# Patient Record
Sex: FEMALE | Race: WHITE
[De-identification: names, ages, dates, MRNs, and addresses within clinical notes are randomized per-mention and may not be internally consistent; named-entity substitution may affect disease eponyms.]

---

## 2017-01-04 ENCOUNTER — HOSPITAL ENCOUNTER (EMERGENCY)
Age: 32
Discharge: HOME | End: 2017-01-04
Payer: COMMERCIAL

## 2017-01-04 PROCEDURE — 99282 EMERGENCY DEPT VISIT SF MDM: CPT

## 2017-01-04 NOTE — ED
General Adult HPI





- General


Chief complaint: Skin/Abscess/Foreign Body


Stated complaint: rash


Time Seen by Provider: 01/04/17 18:16


Source: patient, RN notes reviewed


Mode of arrival: ambulatory


Limitations: no limitations





- History of Present Illness


Initial comments: 


This is a 31-year-old female presents with rash to the chest, left shoulders 

and bilateral hands 1 week.  Patient states she was treated with week ago with 

permethrin cream for scabies.  Patient states the rash is not gone.  Patient 

states the itching is worse at night. Patient denies any fever/chills, nausea/

vomiting/diarrhea.  Patient states she lives at home with her children and they 

have also been treated. Patient denies any recent fever, chills, shortness 

breath, chest pain, abdominal pain, nausea/vomiting/diarrhea, back pain, 

numbness, tingling,  hematuria, headache, or visual changes, or any other 

complaints.








- Related Data


 Previous Rx's











 Medication  Instructions  Recorded


 


Permethrin 5% Cream [Elimite] 1 applic TOPICAL ONCE #1 bottle 01/04/17











 Allergies











Allergy/AdvReac Type Severity Reaction Status Date / Time


 


No Known Allergies Allergy   Verified 01/04/17 18:20














Review of Systems


ROS Statement: 


Those systems with pertinent positive or pertinent negative responses have been 

documented in the HPI.





ROS Other: All systems not noted in ROS Statement are negative.





Past Medical History


Past Medical History: No Reported History


Additional Past Medical History / Comment(s): PANCREATITIS


History of Any Multi-Drug Resistant Organisms: None Reported


Past Surgical History: Cholecystectomy, Tubal Ligation


Past Psychological History: Anxiety


Smoking Status: Current every day smoker


Past Alcohol Use History: None Reported


Past Drug Use History: None Reported





General Exam





- General Exam Comments


Initial Comments: 


General:  The patient is awake and alert, in no distress, and does not appear 

acutely ill.  


Neck:  The neck is supple, there is no tenderness or JVD.  


Cardiovascular:  There is a regular rate and rhythm. No murmur, rub or gallop 

is appreciated.


Respiratory:  Lungs are clear to auscultation, respirations are non-labored, 

breath sounds are equal.  No wheezes, stridor, rales, or rhonchi.


Musculoskeletal:  Normal ROM, no tenderness.  Strength 5/5. Sensation intact. 

Pulses equal bilaterally 2+.  


Neurological:  A&O x 3. CN II-XII intact, There are no obvious motor or sensory 

deficits. Coordination appears grossly intact. Speech is normal.


Skin: There are scattered erythematous lesions with eschar to the left shoulder

, chest and bilateral hands consistent with scabies.  These areas are pruritic.

  Skin is warm and dry.


Psychiatric:  Cooperative, appropriate mood & affect, normal judgment.  








Limitations: no limitations





Course


 Vital Signs











  01/04/17





  17:48


 


Temperature 97.2 F L


 


Pulse Rate 60


 


Respiratory 18





Rate 


 


Blood Pressure 100/71


 


O2 Sat by Pulse 99





Oximetry 














Medical Decision Making





- Medical Decision Making


This is a 31-year-old female who presents with persistent rash.  Patient was 

treated for scabies one week ago.  On physical exam there are scattered 

erythematous lesions with eschar to the left shoulder, chest and bilateral 

hands consistent with scabies.  These areas are pruritic.  Skin is warm and 

dry.  I discussed with patient that she will receive a prescription for a 

repeat treatment with permethrin.  I discussed proper use of permethrin and 

importance of washing clothes and bedding in hot water daily.  I discussed 

return parameters. Discussed that patient should follow up with PCP in one to 2 

days or return to the [facility] for any worsening symptoms or for any further 

concerns.  Patient was receptive to this plan and patient will be discharged 

home.  








Disposition


Clinical Impression: 


 Scabies





Disposition: HOME SELF-CARE


Condition: Good


Instructions:  Scabies (ED)


Additional Instructions: 


Apply from head to toe and leave on for 8-14 hours.  Wash off in the shower.  

Repeat the process in 7 days.  Please wash sheets and clothing in hot water 

every day.Please use medication as discussed.  Please follow-up with family 

doctor in the next 2 days of symptoms have not improved.  Please return to 

emergency room if the symptoms increase or worsen or for any other concerns.


Prescriptions: 


Permethrin 5% Cream [Elimite] 1 applic TOPICAL ONCE #1 bottle


Referrals: 


None,Stated [Primary Care Provider] - 1-2 days


Time of Disposition: 18:25

## 2018-04-12 ENCOUNTER — HOSPITAL ENCOUNTER (EMERGENCY)
Dept: HOSPITAL 47 - EC | Age: 33
Discharge: HOME | End: 2018-04-12
Payer: COMMERCIAL

## 2018-04-12 VITALS
HEART RATE: 76 BPM | TEMPERATURE: 98 F | DIASTOLIC BLOOD PRESSURE: 72 MMHG | SYSTOLIC BLOOD PRESSURE: 122 MMHG | RESPIRATION RATE: 18 BRPM

## 2018-04-12 DIAGNOSIS — M47.897: ICD-10-CM

## 2018-04-12 DIAGNOSIS — M54.5: Primary | ICD-10-CM

## 2018-04-12 DIAGNOSIS — Z87.891: ICD-10-CM

## 2018-04-12 PROCEDURE — 99283 EMERGENCY DEPT VISIT LOW MDM: CPT

## 2018-04-12 PROCEDURE — 72100 X-RAY EXAM L-S SPINE 2/3 VWS: CPT

## 2018-04-12 NOTE — XR
EXAMINATION TYPE: XR lumbar spine 2 or 3V

 

DATE OF EXAM: 4/12/2018

 

COMPARISON: NONE

 

HISTORY: 32-year-old female low back pain

 

TECHNIQUE: 3 views

 

FINDINGS: 

5 lumbar type vertebral bodies. There may be very mild disc space narrowing at L5-S1. Vertebral body 
heights are maintained and alignment is preserved.

 

 

IMPRESSION: 

There may be early degenerative disc disease at L5-S1. No vertebral compression collapse or malalignm
ent.

## 2018-04-12 NOTE — ED
General Adult HPI





- General


Chief complaint: Back Pain/Injury


Stated complaint: Back pain


Time Seen by Provider: 04/12/18 09:45


Source: patient, RN notes reviewed


Mode of arrival: ambulatory


Limitations: no limitations





- History of Present Illness


Initial comments: 





Patient 32-year-old female presented to the emergency room today with a chief 

complaint of increased lower back pain that began approximately 30 minutes ago.

  She states she was at work pushing a bed when she felt a pop in the lower 

back.  She states she feels it radiate down to the top of her foot.  Patient 

states it's worse with movements.  States worse with ambulation.  States worse 

when she is bearing weight on either leg.  Patient denies bowel or bladder 

incontinence retention.  Denies any saddle anesthesia.  Patient states she has 

not taken anything for the pain. Patient denies any recent fever, chills, 

shortness of breath, chest pain, abdominal pain, nausea or vomiting, dysuria or 

hematuria, constipation or diarrhea, headaches or visual changes, or any other 

complaints.





- Related Data


 Previous Rx's











 Medication  Instructions  Recorded


 


Cyclobenzaprine [Flexeril] 10 mg PO TID #20 tab 04/12/18


 


Ibuprofen [Motrin] 600 mg PO Q6HR PRN #40 day 04/12/18











 Allergies











Allergy/AdvReac Type Severity Reaction Status Date / Time


 


No Known Allergies Allergy   Verified 04/12/18 09:46














Review of Systems


ROS Statement: 


Those systems with pertinent positive or pertinent negative responses have been 

documented in the HPI.





ROS Other: All systems not noted in ROS Statement are negative.





Past Medical History


Past Medical History: No Reported History


Additional Past Medical History / Comment(s): PANCREATITIS


History of Any Multi-Drug Resistant Organisms: None Reported


Past Surgical History: Cholecystectomy, Tubal Ligation


Past Psychological History: Anxiety


Smoking Status: Former smoker


Past Alcohol Use History: None Reported


Past Drug Use History: None Reported





General Exam


Limitations: no limitations





Course





 Vital Signs











  04/12/18





  09:42


 


Temperature 98.0 F


 


Pulse Rate 85


 


Respiratory 20





Rate 


 


Blood Pressure 114/67


 


O2 Sat by Pulse 98





Oximetry 














Medical Decision Making





- Medical Decision Making





Patient's x-ray reviewed does show some degenerative changes at L5-S1.  No 

other acute abnormalities X-rays.  Results were discussed with patient.  

Patient did not want any medications here the emergency room.  She is agreeable 

for prescription of ibuprofen and muscle relaxant.  Patient advised that muscle 

relaxer may make her drowsy.  Patient is advised follow-up with employee 

health.  Patient advised to use heat to the area.  Advised to return if 

symptoms increase or worsen or for any other concerns.





Disposition


Clinical Impression: 


 Acute low back pain





Disposition: HOME SELF-CARE


Condition: Good


Instructions:  Acute Low Back Pain (ED)


Additional Instructions: 


Please use medication as discussed.  Please follow-up with family doctor in the 

next 2 days of symptoms have not improved.  Please return to emergency room if 

the symptoms increase or worsen or for any other concerns.


Prescriptions: 


Cyclobenzaprine [Flexeril] 10 mg PO TID #20 tab


Ibuprofen [Motrin] 600 mg PO Q6HR PRN #40 day


 PRN Reason: Pain


Referrals: 


None,Stated [Primary Care Provider] - 1-2 days


Time of Disposition: 10:36

## 2018-08-29 ENCOUNTER — HOSPITAL ENCOUNTER (EMERGENCY)
Dept: HOSPITAL 47 - EC | Age: 33
Discharge: HOME | End: 2018-08-29
Payer: SELF-PAY

## 2018-08-29 VITALS
HEART RATE: 79 BPM | SYSTOLIC BLOOD PRESSURE: 97 MMHG | RESPIRATION RATE: 18 BRPM | DIASTOLIC BLOOD PRESSURE: 63 MMHG | TEMPERATURE: 98.1 F

## 2018-08-29 DIAGNOSIS — M79.672: Primary | ICD-10-CM

## 2018-08-29 DIAGNOSIS — Z87.891: ICD-10-CM

## 2018-08-29 PROCEDURE — 99283 EMERGENCY DEPT VISIT LOW MDM: CPT

## 2018-08-29 NOTE — XR
EXAMINATION TYPE: XR foot complete LT

 

DATE OF EXAM: 8/29/2018

 

CLINICAL HISTORY: pain

 

TECHNIQUE: Frontal, lateral and oblique images of the left foot are obtained.

 

COMPARISON: None.

 

FINDINGS:  There is no acute fracture/dislocation evident. The joint spaces  appear within normal [place]
its.  The overlying soft tissue appears unremarkable.

 

IMPRESSION:  

There is no acute fracture or dislocation.

 

ICD 10 NO FRACTURE, INITIAL EVALUATION

## 2018-08-29 NOTE — ED
Lower Extremity Injury HPI





- General


Chief Complaint: Extremity Injury, Lower


Stated Complaint: Foot Pain


Time Seen by Provider: 08/29/18 16:21


Source: patient


Mode of arrival: ambulatory


Limitations: no limitations





- History of Present Illness


Initial Comments: 


This a 33-year-old female with no past medical history who presents today for 

chief complaint of left foot pain.  Patient states that 3 days ago she stepped 

on a toy that was covered by laundry.  She merely noticed pain in the bottom of 

her foot.  Patient went to work the next day, watch left foot.  Following that 

a patient noticed increasing pain at the area where she made contact with the 

23rd patient denies any lacerations, abrasions








- Related Data


 Previous Rx's











 Medication  Instructions  Recorded


 


Cyclobenzaprine [Flexeril] 10 mg PO TID #20 tab 04/12/18


 


Ibuprofen [Motrin] 600 mg PO Q6HR PRN #40 day 04/12/18


 


Acetaminophen Tab [Tylenol Tab] 500 mg PO Q6H PRN 7 Days #28 tablet 08/29/18


 


Ibuprofen [Motrin] 800 mg PO Q8H PRN 7 Days #21 tab 08/29/18











 Allergies











Allergy/AdvReac Type Severity Reaction Status Date / Time


 


No Known Allergies Allergy   Verified 08/29/18 15:46














Review of Systems


ROS Statement: 


Those systems with pertinent positive or pertinent negative responses have been 

documented in the HPI.





ROS Other: All systems not noted in ROS Statement are negative.





Past Medical History


Past Medical History: No Reported History


Additional Past Medical History / Comment(s): PANCREATITIS


History of Any Multi-Drug Resistant Organisms: None Reported


Past Surgical History: Cholecystectomy, Tubal Ligation


Past Psychological History: Anxiety


Smoking Status: Former smoker


Past Alcohol Use History: None Reported


Past Drug Use History: None Reported





General Exam


Limitations: no limitations





Course


 Vital Signs











  08/29/18 08/29/18





  15:42 17:12


 


Temperature 98.1 F 98.1 F


 


Pulse Rate 79 79


 


Respiratory 18 18





Rate  


 


Blood Pressure 97/63 97/63


 


O2 Sat by Pulse 98 98





Oximetry  














Medical Decision Making





- Medical Decision Making


XR of the foot obtained revealing no acute fracture or FB. There is no 

laceration or abrasion to area of contact,no puncture or ecchymosis. Pt denied 

bleeding following the injury. I feel pt has a contusion of foot from stepping 

on a toy. Given onset after injury and areas of tenderness of palpation on exam 

I do not feel pt has plantar fasciitis. Pt was given RICE instructions and 

instructed to use NSAIDS for pain mgmt, in addition to f/u with PCP in 1-2 

days. Pt agreed with plan and was d/c in stable condition. Case discussed with 

Dr. Frias in detail pt d/ mayur stable condition.








Disposition


Clinical Impression: 


 Left foot pain





Disposition: HOME SELF-CARE


Condition: Good


Instructions:  Foot Sprain (ED)


Additional Instructions: 


Please use medication as discussed.  Please follow-up with family doctor in the 

next 2 days of symptoms have not improved.  Please return to emergency room if 

the symptoms increase or worsen or for any other concerns, as discussed.


Prescriptions: 


Acetaminophen Tab [Tylenol Tab] 500 mg PO Q6H PRN 7 Days #28 tablet


 PRN Reason: Pain


Ibuprofen [Motrin] 800 mg PO Q8H PRN 7 Days #21 tab


 PRN Reason: Pain


Is patient prescribed a controlled substance at d/c from ED?: No


Referrals: 


None,Stated [Primary Care Provider] - 1-2 days


Time of Disposition: 16:44

## 2020-10-16 ENCOUNTER — HOSPITAL ENCOUNTER (EMERGENCY)
Dept: HOSPITAL 47 - EC | Age: 35
Discharge: HOME | End: 2020-10-16
Payer: COMMERCIAL

## 2020-10-16 VITALS — RESPIRATION RATE: 20 BRPM

## 2020-10-16 VITALS — SYSTOLIC BLOOD PRESSURE: 99 MMHG | DIASTOLIC BLOOD PRESSURE: 73 MMHG | TEMPERATURE: 98.8 F | HEART RATE: 77 BPM

## 2020-10-16 DIAGNOSIS — H92.02: ICD-10-CM
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
## 2020-10-16 DIAGNOSIS — S06.0X9A: Primary | ICD-10-CM
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
## 2020-10-16 PROCEDURE — 72125 CT NECK SPINE W/O DYE: CPT

## 2020-10-16 PROCEDURE — 71046 X-RAY EXAM CHEST 2 VIEWS: CPT

## 2020-10-16 PROCEDURE — 70450 CT HEAD/BRAIN W/O DYE: CPT

## 2020-10-16 PROCEDURE — 72128 CT CHEST SPINE W/O DYE: CPT

## 2020-10-16 PROCEDURE — 99284 EMERGENCY DEPT VISIT MOD MDM: CPT
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
